# Patient Record
Sex: MALE | Race: BLACK OR AFRICAN AMERICAN | NOT HISPANIC OR LATINO | Employment: OTHER | URBAN - METROPOLITAN AREA
[De-identification: names, ages, dates, MRNs, and addresses within clinical notes are randomized per-mention and may not be internally consistent; named-entity substitution may affect disease eponyms.]

---
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
## 2023-12-06 ENCOUNTER — OFFICE VISIT (OUTPATIENT)
Dept: FAMILY MEDICINE CLINIC | Facility: CLINIC | Age: 18
End: 2023-12-06
Payer: OTHER GOVERNMENT

## 2023-12-06 VITALS
WEIGHT: 203 LBS | OXYGEN SATURATION: 95 % | HEIGHT: 71 IN | HEART RATE: 66 BPM | BODY MASS INDEX: 28.42 KG/M2 | DIASTOLIC BLOOD PRESSURE: 72 MMHG | TEMPERATURE: 97.7 F | SYSTOLIC BLOOD PRESSURE: 118 MMHG

## 2023-12-06 DIAGNOSIS — L24.5: Primary | ICD-10-CM

## 2023-12-06 DIAGNOSIS — L30.9 DERMATITIS: ICD-10-CM

## 2023-12-06 RX ORDER — TRIAMCINOLONE ACETONIDE 1 MG/G
1 OINTMENT TOPICAL 2 TIMES DAILY
Qty: 60 G | Refills: 0 | Status: SHIPPED | OUTPATIENT
Start: 2023-12-06 | End: 2023-12-20

## 2023-12-06 NOTE — PROGRESS NOTES
"Chief Complaint  Establish Care and Rash (Arm to elbow)    Subjective      History of Present Illness  Zeke Garcia is a 17 y.o. male who presents to [facility] with a past medical history of Autism lives alone with his dad who is 72 years old. He is the sole caregiver for his son.  Over the past year he has had a progressively worsening dry pruritic rash on his dorsal hands and wrists a Pt reports itching and redness, he is using Aveeno cream. He also was given two kinds of cream, with some improvement.   Past Medical History:   Diagnosis Date    Autistic disorder          Objective   Vital Signs:   Vitals:    12/06/23 1243   BP: 118/72   Pulse: 66   Temp: 97.7 °F (36.5 °C)   SpO2: 95%   Weight: 92.1 kg (203 lb)   Height: 180.3 cm (71\")     Body mass index is 28.31 kg/m².    Wt Readings from Last 3 Encounters:   12/06/23 92.1 kg (203 lb) (93%, Z= 1.46)*   11/28/23 93 kg (205 lb) (93%, Z= 1.51)*     * Growth percentiles are based on CDC (Boys, 2-20 Years) data.     BP Readings from Last 3 Encounters:   12/06/23 118/72   11/28/23 126/49       Health Maintenance   Topic Date Due    HPV VACCINES (1 - Male 2-dose series) Never done    HEPATITIS C SCREENING  Never done    ANNUAL PHYSICAL  Never done    COVID-19 Vaccine (4 - 2023-24 season) 09/01/2023    INFLUENZA VACCINE  03/31/2024 (Originally 8/1/2023)    TDAP/TD VACCINES (2 - Td or Tdap) 02/19/2025    MENINGOCOCCAL VACCINE  Completed    Pneumococcal Vaccine 0-64  Aged Out       Physical Exam   Constitutional:       General: He is not in acute distress.     Appearance: Normal appearance. He is not ill-appearing.   HENT:      Head: Normocephalic and atraumatic.      Right Ear: Tympanic membrane, ear canal and external ear normal.      Left Ear: Tympanic membrane, ear canal and external ear normal.      Nose: Nose normal. No congestion or rhinorrhea.      Mouth/Throat:      Mouth: Mucous membranes are moist.      Pharynx: " Oropharynx is clear. No oropharyngeal exudate or posterior oropharyngeal erythema.   Eyes:      General: No scleral icterus.        Right eye: No discharge.         Left eye: No discharge.      Extraocular Movements: Extraocular movements intact.      Conjunctiva/sclera: Conjunctivae normal.      Pupils: Pupils are equal, round, and reactive to light.   Cardiovascular:      Rate and Rhythm: Normal rate and regular rhythm.      Heart sounds: Normal heart sounds. No murmur heard.  Pulmonary:      Effort: Pulmonary effort is normal. No respiratory distress.      Breath sounds: No stridor. Wheezing present. No rhonchi or rales.   Abdominal:      General: Abdomen is flat.   Musculoskeletal:         General: No swelling.      Cervical back: Normal range of motion and neck supple. No rigidity.   Lymphadenopathy:      Cervical: No cervical adenopathy.   Skin:     General: Skin is warm and dry.      Coloration: Skin is not jaundiced or pale.      Findings: Rash (Dry scaly skin on dorsal hands extending up to the proximal wrist about one third up the wrists bilaterally.  Also with a patch of dry scaly discolored skin right antecubital area.  There is some fissuring noted) present. No bruising or lesion.   Neurological:      General: No focal deficit present.      Mental Status: He is alert and oriented to person, place, and time.   Psychiatric:         Mood and Affect: Mood normal.         Thought Content: Thought content normal.         Judgment: Judgment normal.     Result Review :  The following data was reviewed by: Devan Ivy MD on 12/06/2023:      Procedures        Assessment and Plan   Diagnoses and all orders for this visit:    1. Irritant contact dermatitis caused by rubber (Primary)  -     triamcinolone (KENALOG) 0.1 % ointment; Apply 1 application  topically to the appropriate area as directed 2 (Two) Times a Day for 14 days.  Dispense: 60 g; Refill: 0    2. Dermatitis  -     triamcinolone (KENALOG) 0.1 %  ointment; Apply 1 application  topically to the appropriate area as directed 2 (Two) Times a Day for 14 days.  Dispense: 60 g; Refill: 0        Pediatric BMI = 90 %ile (Z= 1.28) based on CDC (Boys, 2-20 Years) BMI-for-age based on BMI available as of 12/6/2023.. BMI is >= 25 and <30. (Overweight) The following options were offered after discussion;: weight loss educational material (shared in after visit summary) and exercise counseling/recommendations         FOLLOW UP  Return in about 3 months (around 3/6/2024).  Patient was given instructions and counseling regarding his condition or for health maintenance advice. Please see specific information pulled into the AVS if appropriate.       Devan Ivy MD  12/06/23  14:02 EST    CURRENT & DISCONTINUED MEDICATIONS  Current Outpatient Medications   Medication Instructions    clotrimazole (LOTRIMIN) 1 % cream Mix in equal proportion with triamcinolone cream and apply twice a day to rash on hands and forearms.  Use for no more than 14 days    methylPREDNISolone (MEDROL) 4 MG dose pack Take as directed on package instructions.    triamcinolone (KENALOG) 0.1 % ointment 1 application , Topical, 2 Times Daily       Medications Discontinued During This Encounter   Medication Reason    triamcinolone (KENALOG) 0.1 % ointment Reorder

## 2024-03-07 ENCOUNTER — OFFICE VISIT (OUTPATIENT)
Dept: FAMILY MEDICINE CLINIC | Facility: CLINIC | Age: 19
End: 2024-03-07
Payer: OTHER GOVERNMENT

## 2024-03-07 VITALS
SYSTOLIC BLOOD PRESSURE: 90 MMHG | BODY MASS INDEX: 26.88 KG/M2 | TEMPERATURE: 97 F | DIASTOLIC BLOOD PRESSURE: 70 MMHG | WEIGHT: 192 LBS | HEART RATE: 82 BPM | HEIGHT: 71 IN | OXYGEN SATURATION: 97 %

## 2024-03-07 DIAGNOSIS — R06.2 WHEEZING: ICD-10-CM

## 2024-03-07 DIAGNOSIS — J40 BRONCHITIS: ICD-10-CM

## 2024-03-07 DIAGNOSIS — R05.9 COUGH, UNSPECIFIED TYPE: ICD-10-CM

## 2024-03-07 DIAGNOSIS — L30.9 DERMATITIS: Primary | ICD-10-CM

## 2024-03-07 DIAGNOSIS — L24.5: ICD-10-CM

## 2024-03-07 PROCEDURE — 1159F MED LIST DOCD IN RCRD: CPT | Performed by: STUDENT IN AN ORGANIZED HEALTH CARE EDUCATION/TRAINING PROGRAM

## 2024-03-07 PROCEDURE — 99213 OFFICE O/P EST LOW 20 MIN: CPT | Performed by: STUDENT IN AN ORGANIZED HEALTH CARE EDUCATION/TRAINING PROGRAM

## 2024-03-07 PROCEDURE — 1160F RVW MEDS BY RX/DR IN RCRD: CPT | Performed by: STUDENT IN AN ORGANIZED HEALTH CARE EDUCATION/TRAINING PROGRAM

## 2024-03-07 RX ORDER — TRIAMCINOLONE ACETONIDE 1 MG/G
OINTMENT TOPICAL
Qty: 80 G | Refills: 2 | Status: SHIPPED | OUTPATIENT
Start: 2024-03-07

## 2024-03-07 RX ORDER — ALBUTEROL SULFATE 90 UG/1
2 AEROSOL, METERED RESPIRATORY (INHALATION) EVERY 4 HOURS PRN
Qty: 25.5 G | Refills: 0 | Status: SHIPPED | OUTPATIENT
Start: 2024-03-07 | End: 2024-04-06

## 2024-03-07 RX ORDER — TRIAMCINOLONE ACETONIDE 1 MG/G
OINTMENT TOPICAL
COMMUNITY
Start: 2023-12-26 | End: 2024-03-07 | Stop reason: SDUPTHER

## 2024-03-07 RX ORDER — AZITHROMYCIN 250 MG/1
TABLET, FILM COATED ORAL
Qty: 6 TABLET | Refills: 0 | Status: SHIPPED | OUTPATIENT
Start: 2024-03-07

## 2024-03-07 NOTE — PROGRESS NOTES
"Chief Complaint  Follow-up    Subjective      History of Present Illness    Zeke Garcia is a 18 y.o. male who presents to [facility] with a past medical history of Autism lives alone with his dad who is 72 years old. He is the sole caregiver for his son.  Over the past year he has had a progressively worsening dry pruritic rash on his dorsal hands and wrists it is improving with he ointment I sent in last time, we did discuss to use it for 14 days on and then stop for 14 days and can re-use it for another 14 days after that. Pt also reports a cough that he has had for 4 months now,Dad states he used to be diagnosed with asthma when he was younger.     Objective   Vital Signs:   Vitals:    03/07/24 1011   BP: 90/70   Pulse: 82   Temp: 97 °F (36.1 °C)   SpO2: 97%   Weight: 87.1 kg (192 lb)   Height: 180.3 cm (71\")     Body mass index is 26.78 kg/m².    Wt Readings from Last 3 Encounters:   03/07/24 87.1 kg (192 lb)   12/06/23 92.1 kg (203 lb) (93%, Z= 1.46)*   11/28/23 93 kg (205 lb) (93%, Z= 1.51)*     * Growth percentiles are based on CDC (Boys, 2-20 Years) data.     BP Readings from Last 3 Encounters:   03/07/24 90/70   12/06/23 118/72   11/28/23 126/49       Health Maintenance   Topic Date Due    HPV VACCINES (1 - Male 3-dose series) Never done    HEPATITIS C SCREENING  Never done    ANNUAL WELLNESS VISIT  Never done    INFLUENZA VACCINE  03/31/2024 (Originally 8/1/2023)    COVID-19 Vaccine (4 - 2023-24 season) 05/27/2024 (Originally 9/1/2023)    BMI FOLLOWUP  12/06/2024    TDAP/TD VACCINES (2 - Td or Tdap) 02/19/2025    MENINGOCOCCAL VACCINE  Completed    Pneumococcal Vaccine 0-64  Aged Out       Physical Exam  HENT:      Head: Normocephalic.      Right Ear: Tympanic membrane normal.      Left Ear: Tympanic membrane normal.      Nose: Nose normal. Congestion and rhinorrhea present.      Mouth/Throat:      Mouth: Mucous membranes are moist.      Pharynx: Oropharynx is " clear. Posterior oropharyngeal erythema present. No oropharyngeal exudate.   Eyes:      Extraocular Movements: Extraocular movements intact.      Conjunctiva/sclera: Conjunctivae normal.      Pupils: Pupils are equal, round, and reactive to light.   Cardiovascular:      Rate and Rhythm: Normal rate and regular rhythm.      Heart sounds: No murmur heard.  Pulmonary:      Effort: Pulmonary effort is normal.      Breath sounds: Wheezing present.   Abdominal:      General: Abdomen is flat.   Musculoskeletal:      Cervical back: Normal range of motion.   Lymphadenopathy:      Cervical: No cervical adenopathy.   Neurological:      Mental Status: He is alert.   Psychiatric:         Mood and Affect: Mood normal.          Result Review :     The following data was reviewed by: Devan Ivy MD on 03/07/2024:      Procedures          Diagnoses and all orders for this visit:    1. Dermatitis (Primary)  -     triamcinolone (KENALOG) 0.1 % ointment; Apply  topically to the appropriate area as directed Every 14 (Fourteen) Days.  Dispense: 80 g; Refill: 2  -     Ambulatory Referral to Dermatology    2. Irritant contact dermatitis caused by rubber  -     triamcinolone (KENALOG) 0.1 % ointment; Apply  topically to the appropriate area as directed Every 14 (Fourteen) Days.  Dispense: 80 g; Refill: 2    3. Wheezing    4. Cough, unspecified type    5. Bronchitis  -     azithromycin (ZITHROMAX) 250 MG tablet; Take 2 tablets PO the first day, then 1 tablet PO daily for 4 days.  Dispense: 6 tablet; Refill: 0    Other orders  -     albuterol sulfate  (90 Base) MCG/ACT inhaler; Inhale 2 puffs Every 4 (Four) Hours As Needed for Wheezing for up to 30 days.  Dispense: 25.5 g; Refill: 0          Treat for possible asthma history and with wheezing on exam, I did give a zpack, and informed the patient to follow-up if not improved in 2-3 days.          FOLLOW UP  Return in about 6 months (around 9/7/2024).  Patient was given instructions  and counseling regarding his condition or for health maintenance advice. Please see specific information pulled into the AVS if appropriate.       Devan Ivy MD  03/08/24  09:17 EST    CURRENT & DISCONTINUED MEDICATIONS  Current Outpatient Medications   Medication Instructions    albuterol sulfate  (90 Base) MCG/ACT inhaler 2 puffs, Inhalation, Every 4 Hours PRN    azithromycin (ZITHROMAX) 250 MG tablet Take 2 tablets PO the first day, then 1 tablet PO daily for 4 days.    clotrimazole (LOTRIMIN) 1 % cream Mix in equal proportion with triamcinolone cream and apply twice a day to rash on hands and forearms.  Use for no more than 14 days    triamcinolone (KENALOG) 0.1 % ointment Topical, Every 14 Days       Medications Discontinued During This Encounter   Medication Reason    triamcinolone (KENALOG) 0.1 % ointment Reorder

## 2024-09-09 ENCOUNTER — LAB (OUTPATIENT)
Dept: LAB | Facility: HOSPITAL | Age: 19
End: 2024-09-09
Payer: MEDICARE

## 2024-09-09 ENCOUNTER — OFFICE VISIT (OUTPATIENT)
Dept: FAMILY MEDICINE CLINIC | Facility: CLINIC | Age: 19
End: 2024-09-09
Payer: MEDICARE

## 2024-09-09 VITALS
OXYGEN SATURATION: 99 % | DIASTOLIC BLOOD PRESSURE: 72 MMHG | HEIGHT: 70 IN | WEIGHT: 187 LBS | TEMPERATURE: 97.9 F | HEART RATE: 62 BPM | BODY MASS INDEX: 26.77 KG/M2 | SYSTOLIC BLOOD PRESSURE: 116 MMHG

## 2024-09-09 DIAGNOSIS — L24.5: ICD-10-CM

## 2024-09-09 DIAGNOSIS — F84.0 AUTISM: ICD-10-CM

## 2024-09-09 DIAGNOSIS — Z00.00 INITIAL MEDICARE ANNUAL WELLNESS VISIT: Primary | ICD-10-CM

## 2024-09-09 LAB
ALBUMIN SERPL-MCNC: 4.9 G/DL (ref 3.5–5.2)
ALBUMIN/GLOB SERPL: 1.9 G/DL
ALP SERPL-CCNC: 60 U/L (ref 39–117)
ALT SERPL W P-5'-P-CCNC: 9 U/L (ref 1–41)
ANION GAP SERPL CALCULATED.3IONS-SCNC: 10 MMOL/L (ref 5–15)
AST SERPL-CCNC: 14 U/L (ref 1–40)
BASOPHILS # BLD AUTO: 0.04 10*3/MM3 (ref 0–0.2)
BASOPHILS NFR BLD AUTO: 0.6 % (ref 0–1.5)
BILIRUB SERPL-MCNC: 0.9 MG/DL (ref 0–1.2)
BUN SERPL-MCNC: 7 MG/DL (ref 6–20)
BUN/CREAT SERPL: 6.3 (ref 7–25)
CALCIUM SPEC-SCNC: 9.9 MG/DL (ref 8.6–10.5)
CHLORIDE SERPL-SCNC: 103 MMOL/L (ref 98–107)
CHOLEST SERPL-MCNC: 119 MG/DL (ref 0–200)
CO2 SERPL-SCNC: 26 MMOL/L (ref 22–29)
CREAT SERPL-MCNC: 1.11 MG/DL (ref 0.76–1.27)
DEPRECATED RDW RBC AUTO: 41.7 FL (ref 37–54)
EGFRCR SERPLBLD CKD-EPI 2021: 97.5 ML/MIN/1.73
EOSINOPHIL # BLD AUTO: 0.42 10*3/MM3 (ref 0–0.4)
EOSINOPHIL NFR BLD AUTO: 6.7 % (ref 0.3–6.2)
ERYTHROCYTE [DISTWIDTH] IN BLOOD BY AUTOMATED COUNT: 12.9 % (ref 12.3–15.4)
GLOBULIN UR ELPH-MCNC: 2.6 GM/DL
GLUCOSE SERPL-MCNC: 114 MG/DL (ref 65–99)
HCT VFR BLD AUTO: 46.1 % (ref 37.5–51)
HDLC SERPL-MCNC: 52 MG/DL (ref 40–60)
HGB BLD-MCNC: 15.4 G/DL (ref 13–17.7)
IMM GRANULOCYTES # BLD AUTO: 0.01 10*3/MM3 (ref 0–0.05)
IMM GRANULOCYTES NFR BLD AUTO: 0.2 % (ref 0–0.5)
LDLC SERPL CALC-MCNC: 57 MG/DL (ref 0–100)
LDLC/HDLC SERPL: 1.13 {RATIO}
LYMPHOCYTES # BLD AUTO: 2.01 10*3/MM3 (ref 0.7–3.1)
LYMPHOCYTES NFR BLD AUTO: 32 % (ref 19.6–45.3)
MCH RBC QN AUTO: 30.1 PG (ref 26.6–33)
MCHC RBC AUTO-ENTMCNC: 33.4 G/DL (ref 31.5–35.7)
MCV RBC AUTO: 90 FL (ref 79–97)
MONOCYTES # BLD AUTO: 0.5 10*3/MM3 (ref 0.1–0.9)
MONOCYTES NFR BLD AUTO: 8 % (ref 5–12)
NEUTROPHILS NFR BLD AUTO: 3.3 10*3/MM3 (ref 1.7–7)
NEUTROPHILS NFR BLD AUTO: 52.5 % (ref 42.7–76)
NRBC BLD AUTO-RTO: 0 /100 WBC (ref 0–0.2)
PLATELET # BLD AUTO: 195 10*3/MM3 (ref 140–450)
PMV BLD AUTO: 11.7 FL (ref 6–12)
POTASSIUM SERPL-SCNC: 4.3 MMOL/L (ref 3.5–5.2)
PROT SERPL-MCNC: 7.5 G/DL (ref 6–8.5)
RBC # BLD AUTO: 5.12 10*6/MM3 (ref 4.14–5.8)
SODIUM SERPL-SCNC: 139 MMOL/L (ref 136–145)
TRIGL SERPL-MCNC: 41 MG/DL (ref 0–150)
TSH SERPL DL<=0.05 MIU/L-ACNC: 0.81 UIU/ML (ref 0.27–4.2)
VLDLC SERPL-MCNC: 10 MG/DL (ref 5–40)
WBC NRBC COR # BLD AUTO: 6.28 10*3/MM3 (ref 3.4–10.8)

## 2024-09-09 PROCEDURE — 1160F RVW MEDS BY RX/DR IN RCRD: CPT | Performed by: STUDENT IN AN ORGANIZED HEALTH CARE EDUCATION/TRAINING PROGRAM

## 2024-09-09 PROCEDURE — G0438 PPPS, INITIAL VISIT: HCPCS | Performed by: STUDENT IN AN ORGANIZED HEALTH CARE EDUCATION/TRAINING PROGRAM

## 2024-09-09 PROCEDURE — 84443 ASSAY THYROID STIM HORMONE: CPT | Performed by: STUDENT IN AN ORGANIZED HEALTH CARE EDUCATION/TRAINING PROGRAM

## 2024-09-09 PROCEDURE — 85025 COMPLETE CBC W/AUTO DIFF WBC: CPT | Performed by: STUDENT IN AN ORGANIZED HEALTH CARE EDUCATION/TRAINING PROGRAM

## 2024-09-09 PROCEDURE — 1159F MED LIST DOCD IN RCRD: CPT | Performed by: STUDENT IN AN ORGANIZED HEALTH CARE EDUCATION/TRAINING PROGRAM

## 2024-09-09 PROCEDURE — 36415 COLL VENOUS BLD VENIPUNCTURE: CPT | Performed by: STUDENT IN AN ORGANIZED HEALTH CARE EDUCATION/TRAINING PROGRAM

## 2024-09-09 PROCEDURE — 99214 OFFICE O/P EST MOD 30 MIN: CPT | Performed by: STUDENT IN AN ORGANIZED HEALTH CARE EDUCATION/TRAINING PROGRAM

## 2024-09-09 PROCEDURE — 1170F FXNL STATUS ASSESSED: CPT | Performed by: STUDENT IN AN ORGANIZED HEALTH CARE EDUCATION/TRAINING PROGRAM

## 2024-09-09 PROCEDURE — 80061 LIPID PANEL: CPT | Performed by: STUDENT IN AN ORGANIZED HEALTH CARE EDUCATION/TRAINING PROGRAM

## 2024-09-09 PROCEDURE — 80053 COMPREHEN METABOLIC PANEL: CPT | Performed by: STUDENT IN AN ORGANIZED HEALTH CARE EDUCATION/TRAINING PROGRAM

## 2024-09-09 RX ORDER — CLOBETASOL PROPIONATE 0.5 MG/G
1 OINTMENT TOPICAL 2 TIMES DAILY
COMMUNITY
Start: 2024-08-28 | End: 2024-09-09 | Stop reason: SDUPTHER

## 2024-09-09 RX ORDER — CLOBETASOL PROPIONATE 0.5 MG/G
1 OINTMENT TOPICAL 2 TIMES DAILY
Qty: 60 G | Refills: 1 | Status: SHIPPED | OUTPATIENT
Start: 2024-09-09 | End: 2024-09-23

## 2024-09-09 NOTE — PROGRESS NOTES
Subjective   The ABCs of the Annual Wellness Visit  Medicare Wellness Visit      Zeke Garcia is a 18 y.o. patient who presents for a Medicare Wellness Visit.    The following portions of the patient's history were reviewed and   updated as appropriate: allergies, current medications, past family history, past medical history, past social history, past surgical history, and problem list.    Compared to one year ago, the patient's physical   health is the same.  Compared to one year ago, the patient's mental   health is the same.    Recent Hospitalizations:  He was not admitted to the hospital during the last year.     Current Medical Providers:  Patient Care Team:  Devan Ivy MD as PCP - General (Family Medicine)    Outpatient Medications Prior to Visit   Medication Sig Dispense Refill    clobetasol (TEMOVATE) 0.05 % ointment Apply 1 Application topically to the appropriate area as directed 2 (Two) Times a Day.      clotrimazole (LOTRIMIN) 1 % cream Mix in equal proportion with triamcinolone cream and apply twice a day to rash on hands and forearms.  Use for no more than 14 days (Patient not taking: Reported on 3/7/2024) 60 g 0    triamcinolone (KENALOG) 0.1 % ointment Apply  topically to the appropriate area as directed Every 14 (Fourteen) Days. 80 g 2    azithromycin (ZITHROMAX) 250 MG tablet Take 2 tablets PO the first day, then 1 tablet PO daily for 4 days. 6 tablet 0     No facility-administered medications prior to visit.     No opioid medication identified on active medication list. I have reviewed chart for other potential  high risk medication/s and harmful drug interactions in the elderly.      Aspirin is not on active medication list.  Aspirin use is not indicated based on review of current medical condition/s. Risk of harm outweighs potential benefits.  .    There is no problem list on file for this patient.    Advance Care Planning Advance Directive is not on file.  ACP discussion was  "declined by the patient. Patient does not have an advance directive, declines further assistance.            Objective   Vitals:    24 1123   BP: 116/72   BP Location: Left arm   Patient Position: Sitting   Cuff Size: Adult   Pulse: 62   Temp: 97.9 °F (36.6 °C)   TempSrc: Oral   SpO2: 99%   Weight: 84.8 kg (187 lb)   Height: 177.8 cm (70\")   PainSc: 0-No pain       Estimated body mass index is 26.83 kg/m² as calculated from the following:    Height as of this encounter: 177.8 cm (70\").    Weight as of this encounter: 84.8 kg (187 lb).            Does the patient have evidence of cognitive impairment? Yes                                                                                                Health  Risk Assessment    Smoking Status:  Social History     Tobacco Use   Smoking Status Never    Passive exposure: Current   Smokeless Tobacco Never     Alcohol Consumption:  Social History     Substance and Sexual Activity   Alcohol Use Never       Fall Risk Screen  STEADI Fall Risk Assessment has not been completed.    Depression Screenin/9/2024    11:25 AM   PHQ-2/PHQ-9 Depression Screening   Little Interest or Pleasure in Doing Things 0-->not at all   Feeling Down, Depressed or Hopeless 0-->not at all   PHQ-9: Brief Depression Severity Measure Score 0     Health Habits and Functional and Cognitive Screenin/9/2024    11:00 AM   Functional & Cognitive Status   Do you have difficulty preparing food and eating? No   Do you have difficulty bathing yourself, getting dressed or grooming yourself? No   Do you have difficulty using the toilet? No   Do you have difficulty moving around from place to place? No   Do you have trouble with steps or getting out of a bed or a chair? No   Current Diet Well Balanced Diet   Dental Exam Not up to date   Eye Exam Not up to date   Exercise (times per week) 6 times per week   Current Exercises Include Walking;Bicycling Outdoors   Do you need help using the phone?  " No   Are you deaf or do you have serious difficulty hearing?  No   Do you need help to go to places out of walking distance? No   Do you need help shopping? No   Do you need help preparing meals?  No   Do you need help with housework?  No   Do you need help with laundry? No   Do you need help taking your medications? No   Do you need help managing money? Yes   Do you ever drive or ride in a car without wearing a seat belt? No   Have you felt unusual stress, anger or loneliness in the last month? No   Who do you live with? Other   If you need help, do you have trouble finding someone available to you? No   Do you have difficulty concentrating, remembering or making decisions? Yes           Age-appropriate Screening Schedule:  Refer to the list below for future screening recommendations based on patient's age, sex and/or medical conditions. Orders for these recommended tests are listed in the plan section. The patient has been provided with a written plan.    Health Maintenance List  Health Maintenance   Topic Date Due    HPV VACCINES (1 - Male 3-dose series) Never done    HEPATITIS C SCREENING  Never done    COVID-19 Vaccine (4 - 2023-24 season) 09/01/2024    INFLUENZA VACCINE  08/01/2024    BMI FOLLOWUP  12/06/2024    TDAP/TD VACCINES (2 - Td or Tdap) 02/19/2025    ANNUAL WELLNESS VISIT  09/09/2025    MENINGOCOCCAL VACCINE  Completed    Pneumococcal Vaccine 0-64  Aged Out                                                                                                                                                CMS Preventative Services Quick Reference  Risk Factors Identified During Encounter  None Identified    The above risks/problems have been discussed with the patient.  Pertinent information has been shared with the patient in the After Visit Summary.  An After Visit Summary and PPPS were made available to the patient.    Follow Up:   Next Medicare Wellness visit to be scheduled in 1 year.          Additional  "E&M Note during same encounter follows:  Patient has multiple medical problems which are significant and separately identifiable that require additional work above and beyond the Medicare Wellness Visit.      Chief Complaint  Follow-up (6m F/U) and Rash (Rash/eczema on both hands and Right arm)    Zeke Garcia is a 18 y.o. male who presents to [facility]   He is seen today for follow-up on contact dermatitis, he seen dermatology 8/28/24 and he is improving with the clobetasol cream.     Objective   Vital Signs:   Vitals:    09/09/24 1123   BP: 116/72   BP Location: Left arm   Patient Position: Sitting   Cuff Size: Adult   Pulse: 62   Temp: 97.9 °F (36.6 °C)   TempSrc: Oral   SpO2: 99%   Weight: 84.8 kg (187 lb)   Height: 177.8 cm (70\")   PainSc: 0-No pain       Wt Readings from Last 3 Encounters:   09/09/24 84.8 kg (187 lb)   03/07/24 87.1 kg (192 lb)   12/06/23 92.1 kg (203 lb) (93%, Z= 1.46)*     * Growth percentiles are based on CDC (Boys, 2-20 Years) data.     BP Readings from Last 3 Encounters:   09/09/24 116/72   03/07/24 90/70   12/06/23 118/72       Physical Exam  Constitutional:       General: He is not in acute distress.     Appearance: Normal appearance. He is not toxic-appearing.   HENT:      Head: Normocephalic and atraumatic.      Right Ear: Tympanic membrane normal.      Left Ear: Tympanic membrane normal.      Nose: Nose normal.      Mouth/Throat:      Mouth: Mucous membranes are moist.   Eyes:      General: No scleral icterus.     Extraocular Movements: Extraocular movements intact.      Conjunctiva/sclera: Conjunctivae normal.      Pupils: Pupils are equal, round, and reactive to light.   Cardiovascular:      Rate and Rhythm: Normal rate and regular rhythm.      Pulses: Normal pulses.      Heart sounds: Normal heart sounds. No murmur heard.     No gallop.   Pulmonary:      Effort: Pulmonary effort is normal. No respiratory distress.   Abdominal:      " General: Abdomen is flat. Bowel sounds are normal. There is no distension.      Palpations: Abdomen is soft.   Musculoskeletal:         General: Normal range of motion.      Cervical back: Normal range of motion.   Skin:     General: Skin is warm and dry.      Capillary Refill: Capillary refill takes less than 2 seconds.   Neurological:      General: No focal deficit present.      Mental Status: He is alert.   Psychiatric:         Mood and Affect: Mood normal.         The following data was reviewed by Devan Ivy MD on 09/09/2024  Common Labs       Assessment & Plan   Diagnoses and all orders for this visit:    1. Initial Medicare annual wellness visit (Primary)  -     Comprehensive Metabolic Panel  -     CBC & Differential  -     TSH  -     Lipid Panel    2. Irritant contact dermatitis caused by rubber  -     clobetasol (TEMOVATE) 0.05 % ointment; Apply 1 Application topically to the appropriate area as directed 2 (Two) Times a Day for 14 days.  Dispense: 60 g; Refill: 1    3. Autism                  FOLLOW UP  Return in about 6 months (around 3/9/2025).  Patient was given instructions and counseling regarding his condition or for health maintenance advice. Please see specific information pulled into the AVS if appropriate.     Devan Ivy MD  09/09/24  15:31 EDT

## 2025-03-10 ENCOUNTER — OFFICE VISIT (OUTPATIENT)
Dept: FAMILY MEDICINE CLINIC | Facility: CLINIC | Age: 20
End: 2025-03-10
Payer: MEDICARE

## 2025-03-10 ENCOUNTER — HOSPITAL ENCOUNTER (OUTPATIENT)
Dept: GENERAL RADIOLOGY | Facility: HOSPITAL | Age: 20
Discharge: HOME | End: 2025-03-10
Admitting: STUDENT IN AN ORGANIZED HEALTH CARE EDUCATION/TRAINING PROGRAM
Payer: MEDICARE

## 2025-03-10 VITALS
HEART RATE: 99 BPM | HEIGHT: 70 IN | OXYGEN SATURATION: 98 % | WEIGHT: 175 LBS | TEMPERATURE: 97.5 F | BODY MASS INDEX: 25.05 KG/M2

## 2025-03-10 DIAGNOSIS — F84.0 AUTISM: Primary | ICD-10-CM

## 2025-03-10 DIAGNOSIS — L30.9 DERMATITIS: ICD-10-CM

## 2025-03-10 DIAGNOSIS — L24.5: ICD-10-CM

## 2025-03-10 DIAGNOSIS — Z11.59 NEED FOR HEPATITIS C SCREENING TEST: ICD-10-CM

## 2025-03-10 DIAGNOSIS — R05.9 COUGH, UNSPECIFIED TYPE: ICD-10-CM

## 2025-03-10 DIAGNOSIS — R06.2 WHEEZING: ICD-10-CM

## 2025-03-10 DIAGNOSIS — J40 BRONCHITIS: ICD-10-CM

## 2025-03-10 PROBLEM — K63.1 PERFORATED SMALL INTESTINE: Status: ACTIVE | Noted: 2019-09-27

## 2025-03-10 PROBLEM — K29.30 CHRONIC SUPERFICIAL GASTRITIS: Status: ACTIVE | Noted: 2020-06-26

## 2025-03-10 PROBLEM — K25.5 PERFORATED GASTRIC ULCER: Status: ACTIVE | Noted: 2019-09-27

## 2025-03-10 PROBLEM — K31.89 PERFORATED STOMACH, ACUTE: Status: ACTIVE | Noted: 2019-09-27

## 2025-03-10 LAB
EXPIRATION DATE: NORMAL
FLUAV AG UPPER RESP QL IA.RAPID: NOT DETECTED
FLUBV AG UPPER RESP QL IA.RAPID: NOT DETECTED
INTERNAL CONTROL: NORMAL
Lab: NORMAL
SARS-COV-2 AG UPPER RESP QL IA.RAPID: NOT DETECTED

## 2025-03-10 PROCEDURE — 71046 X-RAY EXAM CHEST 2 VIEWS: CPT

## 2025-03-10 PROCEDURE — 1126F AMNT PAIN NOTED NONE PRSNT: CPT | Performed by: STUDENT IN AN ORGANIZED HEALTH CARE EDUCATION/TRAINING PROGRAM

## 2025-03-10 PROCEDURE — 87428 SARSCOV & INF VIR A&B AG IA: CPT | Performed by: STUDENT IN AN ORGANIZED HEALTH CARE EDUCATION/TRAINING PROGRAM

## 2025-03-10 PROCEDURE — 1160F RVW MEDS BY RX/DR IN RCRD: CPT | Performed by: STUDENT IN AN ORGANIZED HEALTH CARE EDUCATION/TRAINING PROGRAM

## 2025-03-10 PROCEDURE — 99214 OFFICE O/P EST MOD 30 MIN: CPT | Performed by: STUDENT IN AN ORGANIZED HEALTH CARE EDUCATION/TRAINING PROGRAM

## 2025-03-10 PROCEDURE — 1159F MED LIST DOCD IN RCRD: CPT | Performed by: STUDENT IN AN ORGANIZED HEALTH CARE EDUCATION/TRAINING PROGRAM

## 2025-03-10 RX ORDER — ALBUTEROL SULFATE 90 UG/1
2 INHALANT RESPIRATORY (INHALATION) EVERY 4 HOURS PRN
Qty: 18 G | Refills: 2 | Status: SHIPPED | OUTPATIENT
Start: 2025-03-10 | End: 2025-04-09

## 2025-03-10 RX ORDER — CLOTRIMAZOLE 1 %
CREAM (GRAM) TOPICAL
Qty: 60 G | Refills: 0 | Status: SHIPPED | OUTPATIENT
Start: 2025-03-10

## 2025-03-10 RX ORDER — AZITHROMYCIN 250 MG/1
TABLET, FILM COATED ORAL
Qty: 6 TABLET | Refills: 0 | Status: SHIPPED | OUTPATIENT
Start: 2025-03-10

## 2025-03-10 RX ORDER — TRIAMCINOLONE ACETONIDE 1 MG/G
OINTMENT TOPICAL
Qty: 80 G | Refills: 2 | Status: SHIPPED | OUTPATIENT
Start: 2025-03-10

## 2025-03-10 NOTE — PROGRESS NOTES
"Chief Complaint  Sore Throat and Nasal Congestion    Subjective          Zeke Garcia presents to [facility]  Sore Throat           History of Present Illness  The patient presents for evaluation of a cough. He is accompanied by his father.    The patient's father reports that the patient has been experiencing intermittent coughing episodes. The onset of these symptoms was in January 2025.      Current Outpatient Medications   Medication Instructions    albuterol sulfate  (90 Base) MCG/ACT inhaler 2 puffs, Inhalation, Every 4 Hours PRN    azithromycin (ZITHROMAX) 250 MG tablet Take 2 tablets PO the first day, then 1 tablet PO daily for 4 days.    clotrimazole (LOTRIMIN) 1 % cream Mix in equal proportion with triamcinolone cream and apply twice a day to rash on hands and forearms.  Use for no more than 14 days    triamcinolone (KENALOG) 0.1 % ointment Topical, Every 14 Days       The following portions of the patient's history were reviewed and updated as appropriate: allergies, current medications, past family history, past medical history, past social history, past surgical history, and problem list.    Objective   Vital Signs:   Pulse 99   Temp 97.5 °F (36.4 °C)   Ht 177.8 cm (70\")   Wt 79.4 kg (175 lb)   SpO2 98%   BMI 25.11 kg/m²     BP Readings from Last 3 Encounters:   09/09/24 116/72   03/07/24 90/70   12/06/23 118/72     Wt Readings from Last 3 Encounters:   03/10/25 79.4 kg (175 lb)   09/09/24 84.8 kg (187 lb)   03/07/24 87.1 kg (192 lb)     BMI is >= 25 and <30. (Overweight) The following options were offered after discussion;: weight loss educational material (shared in after visit summary), exercise counseling/recommendations, and nutrition counseling/recommendations     Physical Exam  Constitutional:       General: He is not in acute distress.     Appearance: Normal appearance. He is not toxic-appearing.   HENT:      Head: Normocephalic and " atraumatic.      Right Ear: Tympanic membrane normal.      Left Ear: Tympanic membrane normal.      Nose: Nose normal.      Mouth/Throat:      Mouth: Mucous membranes are moist.   Eyes:      General: No scleral icterus.     Extraocular Movements: Extraocular movements intact.      Conjunctiva/sclera: Conjunctivae normal.      Pupils: Pupils are equal, round, and reactive to light.   Cardiovascular:      Rate and Rhythm: Normal rate and regular rhythm.      Pulses: Normal pulses.      Heart sounds: Normal heart sounds. No murmur heard.     No gallop.   Pulmonary:      Effort: Pulmonary effort is normal. No respiratory distress.   Abdominal:      General: Abdomen is flat. Bowel sounds are normal. There is no distension.      Palpations: Abdomen is soft.   Musculoskeletal:         General: Normal range of motion.      Cervical back: Normal range of motion.   Skin:     General: Skin is warm and dry.      Capillary Refill: Capillary refill takes less than 2 seconds.   Neurological:      General: No focal deficit present.      Mental Status: He is alert.   Psychiatric:         Mood and Affect: Mood normal.              Result Review :   The following data was reviewed by: Devan Ivy MD on 03/10/2025:  Common labs          9/9/2024    12:35   Common Labs   Glucose 114    BUN 7    Creatinine 1.11    Sodium 139    Potassium 4.3    Chloride 103    Calcium 9.9    Albumin 4.9    Total Bilirubin 0.9    Alkaline Phosphatase 60    AST (SGOT) 14    ALT (SGPT) 9    WBC 6.28    Hemoglobin 15.4    Hematocrit 46.1    Platelets 195    Total Cholesterol 119    Triglycerides 41    HDL Cholesterol 52    LDL Cholesterol  57             Lab Results   Component Value Date    SARSANTIGEN Not Detected 03/10/2025    COVID19 Not Detected 08/22/2022    FLUAAG Not Detected 03/10/2025    FLUBAG Not Detected 03/10/2025       Procedures        Assessment and Plan    Diagnoses and all orders for this visit:    1. Autism (Primary)    2.  Dermatitis  -     clotrimazole (LOTRIMIN) 1 % cream; Mix in equal proportion with triamcinolone cream and apply twice a day to rash on hands and forearms.  Use for no more than 14 days  Dispense: 60 g; Refill: 0  -     triamcinolone (KENALOG) 0.1 % ointment; Apply  topically to the appropriate area as directed Every 14 (Fourteen) Days.  Dispense: 80 g; Refill: 2    3. Irritant contact dermatitis caused by rubber  -     triamcinolone (KENALOG) 0.1 % ointment; Apply  topically to the appropriate area as directed Every 14 (Fourteen) Days.  Dispense: 80 g; Refill: 2    4. Need for hepatitis C screening test    5. Cough, unspecified type  -     POCT SARS-CoV-2 Antigen ROBERT + Flu  -     XR Chest PA & Lateral; Future    6. Wheezing  -     albuterol sulfate  (90 Base) MCG/ACT inhaler; Inhale 2 puffs Every 4 (Four) Hours As Needed for Wheezing for up to 30 days.  Dispense: 18 g; Refill: 2    7. Bronchitis  -     azithromycin (ZITHROMAX) 250 MG tablet; Take 2 tablets PO the first day, then 1 tablet PO daily for 4 days.  Dispense: 6 tablet; Refill: 0          Assessment & Plan  1. Suspected asthma.  There is significant wheezing noted upon examination. Influenza and COVID-19 tests were negative. An inhaler will be prescribed and sent to [facility]. He is instructed to exhale fully, take a puff, inhale deeply, hold the breath for 3 seconds, and then breathe out.    2. Potential pneumonia.  Due to the presence of wheezing, pneumonia is considered a differential diagnosis. A Z-Parker (antibiotics) will be prescribed. An x-ray is ordered to be done at [facility] either on Saturday or Sunday before the follow-up appointment.    Follow-up  The patient will follow up in 2 weeks.      Medications Discontinued During This Encounter   Medication Reason    clotrimazole (LOTRIMIN) 1 % cream Reorder    triamcinolone (KENALOG) 0.1 % ointment Reorder          Follow Up   Return in about 2 weeks (around  3/24/2025) for Recheck.  Patient was given instructions and counseling regarding his condition or for health maintenance advice. Please see specific information pulled into the AVS if appropriate.       Devan Ivy MD  03/10/25  14:35 EDT      Patient or patient representative verbalized consent for the use of Ambient Listening during the visit with  Devan Ivy MD for chart documentation. 3/10/2025  11:15 EDT

## 2025-03-24 ENCOUNTER — OFFICE VISIT (OUTPATIENT)
Dept: FAMILY MEDICINE CLINIC | Facility: CLINIC | Age: 20
End: 2025-03-24
Payer: MEDICARE

## 2025-03-24 VITALS
BODY MASS INDEX: 25.05 KG/M2 | HEART RATE: 76 BPM | OXYGEN SATURATION: 96 % | WEIGHT: 175 LBS | TEMPERATURE: 97.5 F | HEIGHT: 70 IN

## 2025-03-24 DIAGNOSIS — L30.9 DERMATITIS: Primary | ICD-10-CM

## 2025-03-24 DIAGNOSIS — Z09 FOLLOW-UP EXAM: ICD-10-CM

## 2025-03-24 DIAGNOSIS — L24.5: ICD-10-CM

## 2025-03-24 DIAGNOSIS — F84.0 AUTISM: ICD-10-CM

## 2025-03-24 PROCEDURE — 1159F MED LIST DOCD IN RCRD: CPT | Performed by: STUDENT IN AN ORGANIZED HEALTH CARE EDUCATION/TRAINING PROGRAM

## 2025-03-24 PROCEDURE — G2211 COMPLEX E/M VISIT ADD ON: HCPCS | Performed by: STUDENT IN AN ORGANIZED HEALTH CARE EDUCATION/TRAINING PROGRAM

## 2025-03-24 PROCEDURE — 99214 OFFICE O/P EST MOD 30 MIN: CPT | Performed by: STUDENT IN AN ORGANIZED HEALTH CARE EDUCATION/TRAINING PROGRAM

## 2025-03-24 PROCEDURE — 1160F RVW MEDS BY RX/DR IN RCRD: CPT | Performed by: STUDENT IN AN ORGANIZED HEALTH CARE EDUCATION/TRAINING PROGRAM

## 2025-03-24 RX ORDER — TRIAMCINOLONE ACETONIDE 1 MG/G
OINTMENT TOPICAL
Qty: 80 G | Refills: 2 | Status: SHIPPED | OUTPATIENT
Start: 2025-03-24

## 2025-03-24 NOTE — PROGRESS NOTES
"Chief Complaint  Follow-up    Subjective          Zeke Garcia presents to [facility]  History of Present Illness      History of Present Illness  The patient presents for a follow-up visit.    He reports an improvement in his cough, which he attributes to the completion of his antibiotic course. He has also undergone a radiographic examination.    Additionally, he notes a significant improvement in the condition of his hands and expresses a need for medication refills.      Current Outpatient Medications   Medication Instructions    albuterol sulfate  (90 Base) MCG/ACT inhaler 2 puffs, Inhalation, Every 4 Hours PRN    clotrimazole (LOTRIMIN) 1 % cream Mix in equal proportion with triamcinolone cream and apply twice a day to rash on hands and forearms.  Use for no more than 14 days    triamcinolone (KENALOG) 0.1 % ointment Topical, Every 14 Days       The following portions of the patient's history were reviewed and updated as appropriate: allergies, current medications, past family history, past medical history, past social history, past surgical history, and problem list.    Objective   Vital Signs:   Pulse 76   Temp 97.5 °F (36.4 °C)   Ht 177.8 cm (70\")   Wt 79.4 kg (175 lb)   SpO2 96%   BMI 25.11 kg/m²     BP Readings from Last 3 Encounters:   09/09/24 116/72   03/07/24 90/70   12/06/23 118/72     Wt Readings from Last 3 Encounters:   03/24/25 79.4 kg (175 lb)   03/10/25 79.4 kg (175 lb)   09/09/24 84.8 kg (187 lb)           Physical Exam     General:  AAO x3, no acute distress.  Eyes:  EOMI, PERRLA  Ears/Nose/Mouth/Throat:  Moist mucous membranes  Respiratory:  CTA bilaterally, no wheezes rales or rhonchi  Cardiovascular:  RRR no murmur rubs or gallops  Gastrointestinal:  Abdomen soft nondistended nontender bowel sounds present x4 quadrants  Musculoskeletal:  Moves all 4 extremities spontaneously, no apparent weakness  Skin:  Warm, dry, no skin rashes or " lesions  Neurologic:  AAO x3, cranial nerves 2-12 grossly intact, no focal neuro deficits  Psychiatric:  Normal mood and affect      Result Review :   The following data was reviewed by: Devan Ivy MD on 03/24/2025:  Common labs          9/9/2024    12:35   Common Labs   Glucose 114    BUN 7    Creatinine 1.11    Sodium 139    Potassium 4.3    Chloride 103    Calcium 9.9    Albumin 4.9    Total Bilirubin 0.9    Alkaline Phosphatase 60    AST (SGOT) 14    ALT (SGPT) 9    WBC 6.28    Hemoglobin 15.4    Hematocrit 46.1    Platelets 195    Total Cholesterol 119    Triglycerides 41    HDL Cholesterol 52    LDL Cholesterol  57             Lab Results   Component Value Date    SARSANTIGEN Not Detected 03/10/2025    COVID19 Not Detected 08/22/2022    FLUAAG Not Detected 03/10/2025    FLUBAG Not Detected 03/10/2025       Procedures        Assessment and Plan    Diagnoses and all orders for this visit:    1. Dermatitis (Primary)  -     triamcinolone (KENALOG) 0.1 % ointment; Apply  topically to the appropriate area as directed Every 14 (Fourteen) Days.  Dispense: 80 g; Refill: 2    2. Autism    3. Follow-up exam    4. Irritant contact dermatitis caused by rubber  -     triamcinolone (KENALOG) 0.1 % ointment; Apply  topically to the appropriate area as directed Every 14 (Fourteen) Days.  Dispense: 80 g; Refill: 2          Assessment & Plan  1. Pneumonia.  His condition is showing signs of improvement, as evidenced by the resolution of his cough and the completion of his antibiotic regimen. The radiographic examination further supports this assessment. Wheezing is still present upon lung examination. He is advised to continue monitoring his symptoms and seek medical attention if there is any worsening.    2. Hand condition.  He reports that his hands are much better. A prescription refill for his hand medication will be provided.      Medications Discontinued During This Encounter   Medication Reason    azithromycin  (ZITHROMAX) 250 MG tablet     triamcinolone (KENALOG) 0.1 % ointment Reorder          Follow Up   No follow-ups on file.  Patient was given instructions and counseling regarding his condition or for health maintenance advice. Please see specific information pulled into the AVS if appropriate.       Devan Ivy MD  03/24/25  14:14 EDT      Patient or patient representative verbalized consent for the use of Ambient Listening during the visit with  Devan Ivy MD for chart documentation. 3/24/2025  14:14 EDT

## 2025-06-26 ENCOUNTER — OFFICE VISIT (OUTPATIENT)
Dept: FAMILY MEDICINE CLINIC | Facility: CLINIC | Age: 20
End: 2025-06-26
Payer: MEDICARE

## 2025-06-26 VITALS
SYSTOLIC BLOOD PRESSURE: 100 MMHG | OXYGEN SATURATION: 97 % | WEIGHT: 194 LBS | HEART RATE: 85 BPM | TEMPERATURE: 97.5 F | DIASTOLIC BLOOD PRESSURE: 60 MMHG | BODY MASS INDEX: 27.77 KG/M2 | HEIGHT: 70 IN

## 2025-06-26 DIAGNOSIS — F84.0 AUTISM: ICD-10-CM

## 2025-06-26 DIAGNOSIS — L30.9 DERMATITIS: ICD-10-CM

## 2025-06-26 DIAGNOSIS — L24.5: Primary | ICD-10-CM

## 2025-06-26 RX ORDER — TACROLIMUS 1 MG/G
1 OINTMENT TOPICAL 2 TIMES DAILY
COMMUNITY
End: 2025-06-26 | Stop reason: SDUPTHER

## 2025-06-26 RX ORDER — TACROLIMUS 1 MG/G
1 OINTMENT TOPICAL 2 TIMES DAILY
Qty: 100 G | Refills: 1 | Status: SHIPPED | OUTPATIENT
Start: 2025-06-26

## 2025-06-26 NOTE — PROGRESS NOTES
"Chief Complaint  Eczema    Subjective          Zeke Garcia presents to [facility]  Eczema        History of Present Illness  The patient presents for a Medicare wellness visit. He is accompanied by his father.    The patient's father reports that he has been experiencing a rash, which has shown signs of improvement. He has sought consultation from a dermatologist at [facility] in Dermatology, who prescribed an ointment for the rash. A refill of this ointment is requested.    The patient's father reports no recent illnesses, including coughing or congestion. He has no history of asthma or respiratory issues, except for an episode of croup when he was 19-year-old. The patient had a perforated stomach, which was attributed to breastfeeding.      Current Outpatient Medications   Medication Instructions    clotrimazole (LOTRIMIN) 1 % cream Mix in equal proportion with triamcinolone cream and apply twice a day to rash on hands and forearms.  Use for no more than 14 days    tacrolimus (PROTOPIC) 0.1 % ointment 1 Application, Topical, 2 Times Daily    triamcinolone (KENALOG) 0.1 % ointment Topical, Every 14 Days       The following portions of the patient's history were reviewed and updated as appropriate: allergies, current medications, past family history, past medical history, past social history, past surgical history, and problem list.    Objective   Vital Signs:   /60   Pulse 85   Temp 97.5 °F (36.4 °C)   Ht 177.8 cm (70\")   Wt 88 kg (194 lb)   SpO2 97%   BMI 27.84 kg/m²     BP Readings from Last 3 Encounters:   06/26/25 100/60   09/09/24 116/72   03/07/24 90/70     Wt Readings from Last 3 Encounters:   06/26/25 88 kg (194 lb)   03/24/25 79.4 kg (175 lb)   03/10/25 79.4 kg (175 lb)           Physical Exam  Constitutional:       General: He is not in acute distress.     Appearance: Normal appearance. He is not toxic-appearing.   HENT:      Head: Normocephalic and " atraumatic.      Right Ear: Tympanic membrane normal.      Left Ear: Tympanic membrane normal.      Nose: Nose normal.      Mouth/Throat:      Mouth: Mucous membranes are moist.   Eyes:      General: No scleral icterus.     Extraocular Movements: Extraocular movements intact.      Conjunctiva/sclera: Conjunctivae normal.      Pupils: Pupils are equal, round, and reactive to light.   Cardiovascular:      Rate and Rhythm: Normal rate and regular rhythm.      Pulses: Normal pulses.      Heart sounds: Normal heart sounds. No murmur heard.     No gallop.   Pulmonary:      Effort: Pulmonary effort is normal. No respiratory distress.   Abdominal:      General: Abdomen is flat. Bowel sounds are normal. There is no distension.      Palpations: Abdomen is soft.   Musculoskeletal:         General: Normal range of motion.      Cervical back: Normal range of motion.   Skin:     General: Skin is warm and dry.      Capillary Refill: Capillary refill takes less than 2 seconds.   Neurological:      General: No focal deficit present.      Mental Status: He is alert.   Psychiatric:         Mood and Affect: Mood normal.              Result Review :   The following data was reviewed by: Devan Ivy MD on 06/26/2025:  Common labs          9/9/2024    12:35   Common Labs   Glucose 114    BUN 7    Creatinine 1.11    Sodium 139    Potassium 4.3    Chloride 103    Calcium 9.9    Albumin 4.9    Total Bilirubin 0.9    Alkaline Phosphatase 60    AST (SGOT) 14    ALT (SGPT) 9    WBC 6.28    Hemoglobin 15.4    Hematocrit 46.1    Platelets 195    Total Cholesterol 119    Triglycerides 41    HDL Cholesterol 52    LDL Cholesterol  57             Lab Results   Component Value Date    SARSANTIGEN Not Detected 03/10/2025    COVID19 Not Detected 08/22/2022    FLUAAG Not Detected 03/10/2025    FLUBAG Not Detected 03/10/2025       Procedures        Assessment and Plan    Diagnoses and all orders for this visit:    1. Irritant contact dermatitis caused  by rubber (Primary)  -     tacrolimus (PROTOPIC) 0.1 % ointment; Apply 1 Application topically to the appropriate area as directed 2 (Two) Times a Day.  Dispense: 100 g; Refill: 1    2. Autism    3. Dermatitis  -     tacrolimus (PROTOPIC) 0.1 % ointment; Apply 1 Application topically to the appropriate area as directed 2 (Two) Times a Day.  Dispense: 100 g; Refill: 1          Assessment & Plan  1. Atopic dermatitis   - The rash has shown significant improvement.  - Physical exam findings indicate no active symptoms.  - Previous prescription from the dermatologist was discussed.  - A prescription refill for the ointment will be provided.    2. Follow-up   - No recent illnesses, including coughing or congestion.  - Physical exam findings show normal heart and lung sounds.  - No history of asthma or respiratory issues, except for an episode of croup when he was 3 months old.  - Follow-up scheduled in 3 months for Medicare wellness visit.      Medications Discontinued During This Encounter   Medication Reason    tacrolimus (PROTOPIC) 0.1 % ointment Reorder          Follow Up   Return in about 3 months (around 9/26/2025).  Patient was given instructions and counseling regarding his condition or for health maintenance advice. Please see specific information pulled into the AVS if appropriate.       Devan Ivy MD  06/26/25  11:16 EDT      Patient or patient representative verbalized consent for the use of Ambient Listening during the visit with  Devan Ivy MD for chart documentation. 6/26/2025  11:11 EDT